# Patient Record
Sex: MALE | Race: WHITE | Employment: STUDENT | URBAN - METROPOLITAN AREA
[De-identification: names, ages, dates, MRNs, and addresses within clinical notes are randomized per-mention and may not be internally consistent; named-entity substitution may affect disease eponyms.]

---
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
## 2021-10-05 ENCOUNTER — OFFICE VISIT (OUTPATIENT)
Dept: FAMILY MEDICINE CLINIC | Facility: CLINIC | Age: 21
End: 2021-10-05
Payer: COMMERCIAL

## 2021-10-05 VITALS
SYSTOLIC BLOOD PRESSURE: 102 MMHG | HEIGHT: 75.5 IN | DIASTOLIC BLOOD PRESSURE: 60 MMHG | RESPIRATION RATE: 16 BRPM | BODY MASS INDEX: 25.35 KG/M2 | TEMPERATURE: 98 F | WEIGHT: 206 LBS | OXYGEN SATURATION: 97 % | HEART RATE: 103 BPM

## 2021-10-05 DIAGNOSIS — J03.90 TONSILLITIS: Primary | ICD-10-CM

## 2021-10-05 PROCEDURE — 3008F BODY MASS INDEX DOCD: CPT | Performed by: PHYSICIAN ASSISTANT

## 2021-10-05 PROCEDURE — 3078F DIAST BP <80 MM HG: CPT | Performed by: PHYSICIAN ASSISTANT

## 2021-10-05 PROCEDURE — 87880 STREP A ASSAY W/OPTIC: CPT | Performed by: PHYSICIAN ASSISTANT

## 2021-10-05 PROCEDURE — 3074F SYST BP LT 130 MM HG: CPT | Performed by: PHYSICIAN ASSISTANT

## 2021-10-05 PROCEDURE — 99213 OFFICE O/P EST LOW 20 MIN: CPT | Performed by: PHYSICIAN ASSISTANT

## 2021-10-05 PROCEDURE — 87081 CULTURE SCREEN ONLY: CPT | Performed by: PHYSICIAN ASSISTANT

## 2021-10-05 RX ORDER — AMOXICILLIN AND CLAVULANATE POTASSIUM 875; 125 MG/1; MG/1
1 TABLET, FILM COATED ORAL 2 TIMES DAILY
Qty: 20 TABLET | Refills: 0 | Status: SHIPPED | OUTPATIENT
Start: 2021-10-05 | End: 2021-10-15

## 2021-10-05 NOTE — PROGRESS NOTES
CHIEF COMPLAINT:   Patient presents with:  Fever: I've been tested for covid and it's negative. Worried may have strep throat - Entered by patient        HPI:   Jun Humphries is 22year old male presents to clinic with complaint of  sore throat.   Sympto clear, sclera white  EARS: TM's clear, non-injected, no bulging, retraction, or fluid bilaterally  NOSE: nares patent, mild nasal discharge and mucosal congestion. THROAT: Posterior pharynx erythematous and injected.  no PND, scant right tonsillar exudates

## 2021-10-05 NOTE — PATIENT INSTRUCTIONS
Tonsillitis in Adults  Tonsillitis is swelling and redness (inflammation) of the tonsils. It happens when the tonsils are infected by a virus or a bacteria. Your tonsils are 2 pink, oval lymph glands at the back of your throat.  They are part of your immu lymph nodes in the neck  · Bacteria and debris collecting on the tonsils (called tonsil stones)  Peritonsillar abscess  This is a severe form of tonsillitis. It occurs when a pocket of pus (an abscess) forms around the tonsil.  You need treatment right away fever and pain, as directed. · Use a cool-mist humidifier to keep the air moist.  In severe cases, a person may be dehydrated or have a blocked airway. They may need to be hospitalized.   Surgery to remove the tonsils (tonsillectomy) may be needed if you h healthcare professional's instructions.

## 2023-07-26 PROBLEM — F98.8 ATTENTION DEFICIT DISORDER (ADD) WITHOUT HYPERACTIVITY: Status: ACTIVE | Noted: 2023-07-26

## 2023-07-26 PROBLEM — L63.0 ALOPECIA (CAPITIS) TOTALIS: Status: ACTIVE | Noted: 2023-07-26

## 2023-08-21 DIAGNOSIS — F98.8 ATTENTION DEFICIT DISORDER (ADD) WITHOUT HYPERACTIVITY: ICD-10-CM

## 2023-08-21 RX ORDER — DEXTROAMPHETAMINE SACCHARATE, AMPHETAMINE ASPARTATE MONOHYDRATE, DEXTROAMPHETAMINE SULFATE AND AMPHETAMINE SULFATE 2.5; 2.5; 2.5; 2.5 MG/1; MG/1; MG/1; MG/1
10 CAPSULE, EXTENDED RELEASE ORAL EVERY MORNING
Qty: 30 CAPSULE | Refills: 0 | Status: SHIPPED | OUTPATIENT
Start: 2023-08-21 | End: 2023-09-20

## 2023-08-23 DIAGNOSIS — F98.8 ATTENTION DEFICIT DISORDER (ADD) WITHOUT HYPERACTIVITY: ICD-10-CM

## 2023-08-23 RX ORDER — DEXTROAMPHETAMINE SACCHARATE, AMPHETAMINE ASPARTATE MONOHYDRATE, DEXTROAMPHETAMINE SULFATE AND AMPHETAMINE SULFATE 2.5; 2.5; 2.5; 2.5 MG/1; MG/1; MG/1; MG/1
10 CAPSULE, EXTENDED RELEASE ORAL EVERY MORNING
Qty: 30 CAPSULE | Refills: 0 | Status: SHIPPED | OUTPATIENT
Start: 2023-08-23 | End: 2023-08-28

## 2023-08-23 NOTE — TELEPHONE ENCOUNTER
Patient calling, patient looking to get early fill on Adderall. Patient states he will be driving to school out of state, will like to get medication before he leaves. Pharmacy states new script needed.

## 2023-08-27 ENCOUNTER — PATIENT MESSAGE (OUTPATIENT)
Dept: FAMILY MEDICINE CLINIC | Facility: CLINIC | Age: 23
End: 2023-08-27

## 2023-08-27 DIAGNOSIS — F98.8 ATTENTION DEFICIT DISORDER (ADD) WITHOUT HYPERACTIVITY: ICD-10-CM

## 2023-08-28 RX ORDER — DEXTROAMPHETAMINE SACCHARATE, AMPHETAMINE ASPARTATE MONOHYDRATE, DEXTROAMPHETAMINE SULFATE AND AMPHETAMINE SULFATE 2.5; 2.5; 2.5; 2.5 MG/1; MG/1; MG/1; MG/1
10 CAPSULE, EXTENDED RELEASE ORAL EVERY MORNING
Qty: 30 CAPSULE | Refills: 0 | Status: SHIPPED | OUTPATIENT
Start: 2023-08-28 | End: 2023-09-27

## 2023-08-28 NOTE — TELEPHONE ENCOUNTER
From: Sydni Guerra  To: Nina Garduno MD  Sent: 8/27/2023 4:24 PM CDT  Subject: Pharmacy    Hello! I just moved down to [place] for school and I am all moved in. Before i left, I tried to  my medication from the [facility] but they said that I was picking it up too early and that i would need some type of special clearance or vacation insurance to pick it up early. The pharmacist said that the soonest I could pick it up is September 4th but I have been out of it for 2 days now. I wanted to change my home pharmacy to the [facility] by my apartment. The address is: [address] [place] [address], [address]  [facility]    I also wanted to know if I could pick it up early because my first day of class is this Tuesday. Please let me know if you need anything on my end. Thank You!

## 2023-09-28 DIAGNOSIS — F98.8 ATTENTION DEFICIT DISORDER (ADD) WITHOUT HYPERACTIVITY: ICD-10-CM

## 2023-09-28 RX ORDER — DEXTROAMPHETAMINE SACCHARATE, AMPHETAMINE ASPARTATE MONOHYDRATE, DEXTROAMPHETAMINE SULFATE AND AMPHETAMINE SULFATE 2.5; 2.5; 2.5; 2.5 MG/1; MG/1; MG/1; MG/1
10 CAPSULE, EXTENDED RELEASE ORAL EVERY MORNING
Qty: 30 CAPSULE | Refills: 0 | Status: CANCELLED | OUTPATIENT
Start: 2023-09-28 | End: 2023-10-28

## 2023-10-26 DIAGNOSIS — F98.8 ATTENTION DEFICIT DISORDER (ADD) WITHOUT HYPERACTIVITY: ICD-10-CM

## 2023-10-27 NOTE — TELEPHONE ENCOUNTER
Requesting Adderall XR 10mg  LOV: 8/21/23   RTC: 3 months  Last Relevant Labs:   Filled: 9/28/23 #30 with 0 refills    No future appointments.     Per [place] , last dispensed 9/28/23 #30    Rx pended and routed for approval/denial

## 2023-10-29 RX ORDER — DEXTROAMPHETAMINE SACCHARATE, AMPHETAMINE ASPARTATE MONOHYDRATE, DEXTROAMPHETAMINE SULFATE AND AMPHETAMINE SULFATE 2.5; 2.5; 2.5; 2.5 MG/1; MG/1; MG/1; MG/1
10 CAPSULE, EXTENDED RELEASE ORAL EVERY MORNING
Qty: 30 CAPSULE | Refills: 0 | Status: SHIPPED | OUTPATIENT
Start: 2023-10-29 | End: 2023-11-28

## 2023-11-26 DIAGNOSIS — F98.8 ATTENTION DEFICIT DISORDER (ADD) WITHOUT HYPERACTIVITY: ICD-10-CM

## 2023-11-26 RX ORDER — DEXTROAMPHETAMINE SACCHARATE, AMPHETAMINE ASPARTATE MONOHYDRATE, DEXTROAMPHETAMINE SULFATE AND AMPHETAMINE SULFATE 2.5; 2.5; 2.5; 2.5 MG/1; MG/1; MG/1; MG/1
10 CAPSULE, EXTENDED RELEASE ORAL EVERY MORNING
Qty: 30 CAPSULE | Refills: 0 | Status: SHIPPED | OUTPATIENT
Start: 2023-11-26 | End: 2023-12-26

## 2023-12-23 DIAGNOSIS — F98.8 ATTENTION DEFICIT DISORDER (ADD) WITHOUT HYPERACTIVITY: ICD-10-CM

## 2023-12-26 RX ORDER — DEXTROAMPHETAMINE SACCHARATE, AMPHETAMINE ASPARTATE MONOHYDRATE, DEXTROAMPHETAMINE SULFATE AND AMPHETAMINE SULFATE 2.5; 2.5; 2.5; 2.5 MG/1; MG/1; MG/1; MG/1
10 CAPSULE, EXTENDED RELEASE ORAL EVERY MORNING
Qty: 30 CAPSULE | Refills: 0 | Status: SHIPPED | OUTPATIENT
Start: 2023-12-26 | End: 2024-01-25

## 2024-01-22 DIAGNOSIS — F98.8 ATTENTION DEFICIT DISORDER (ADD) WITHOUT HYPERACTIVITY: ICD-10-CM

## 2024-01-22 RX ORDER — DEXTROAMPHETAMINE SACCHARATE, AMPHETAMINE ASPARTATE MONOHYDRATE, DEXTROAMPHETAMINE SULFATE AND AMPHETAMINE SULFATE 2.5; 2.5; 2.5; 2.5 MG/1; MG/1; MG/1; MG/1
10 CAPSULE, EXTENDED RELEASE ORAL EVERY MORNING
Qty: 30 CAPSULE | Refills: 0 | Status: SHIPPED | OUTPATIENT
Start: 2024-01-22 | End: 2024-02-21

## 2024-01-25 NOTE — PROGRESS NOTES
Dx:  Sprain of costal cartilage (S23.41XA)  Sprain of ribs (S23.41XA)       Authorized # of Visits: NA       Next MD visit: none scheduled  Fall Risk: standard         Precautions: n/a           Subjective: Patient states that he did not feel pain with act g  lunges with OH lift 10# each arm x 1 L       Prone central PA glides Grade III T1 to T6 30 sec x 3 x x       Seated thoracic thrust X 1 rep TRX rotation (B) x 7 reps        TRX flexion for UE & spine stretch  x       Skilled Services: Manual therapy, ve Render Post-Care Instructions In Note?: no Post-Care Instructions: I reviewed with the patient in detail post-care instructions. Patient is to wear sunprotection, and avoid picking at any of the treated lesions. Pt may apply Vaseline to crusted or scabbing areas. Consent: The patient's consent was obtained including but not limited to risks of crusting, scabbing, blistering, scarring, darker or lighter pigmentary change, recurrence, incomplete removal and infection. Show Aperture Variable?: Yes Duration Of Freeze Thaw-Cycle (Seconds): 0 Detail Level: Detailed

## 2024-03-05 ENCOUNTER — PATIENT MESSAGE (OUTPATIENT)
Dept: FAMILY MEDICINE CLINIC | Facility: CLINIC | Age: 24
End: 2024-03-05

## 2024-03-05 NOTE — TELEPHONE ENCOUNTER
From: João Posey  To: Oj Martinez  Sent: 3/5/2024 1:42 PM CST  Subject: Medication Refill Error    Hey Dr. Martinez,  For some reason, sometimes whenever I go to refill my medication through this [facility] steve, it deletes the medication out of mine so Im unable to request a refill. Just wanted to know if I could request and refill or if you could help.     Thanks and Have a Good One!  -João

## 2024-03-06 DIAGNOSIS — F98.8 ATTENTION DEFICIT DISORDER (ADD) WITHOUT HYPERACTIVITY: ICD-10-CM

## 2024-03-06 RX ORDER — DEXTROAMPHETAMINE SACCHARATE, AMPHETAMINE ASPARTATE MONOHYDRATE, DEXTROAMPHETAMINE SULFATE AND AMPHETAMINE SULFATE 2.5; 2.5; 2.5; 2.5 MG/1; MG/1; MG/1; MG/1
10 CAPSULE, EXTENDED RELEASE ORAL EVERY MORNING
Qty: 30 CAPSULE | Refills: 0 | Status: SHIPPED | OUTPATIENT
Start: 2024-03-06 | End: 2024-04-05

## 2024-03-06 NOTE — TELEPHONE ENCOUNTER
Medication Quantity Refills Start End   amphetamine-dextroamphetamine ER (ADDERALL XR) 10 MG Oral Capsule SR 24 Hr () 30 capsule 0 2024   Sig:   Take 1 capsule (10 mg total) by mouth every morning.     Route:   Oral     Earliest Fill Date:   2024     Order #:   [id number]       Video appt 23  No future appointments.     Amphetamine-Dextroamphetamine     Dispensed Written Strength Quantity Refills Days Supply Provider Pharmacy   D-AMPHETAMINE ER 10MG SALT COMBO CP 2024  30 each  30 Oj Martinez MD [facility] #...   AMPHETAMINE-DEXTROAMPHET ER 10 MG C AMPHETAMINE-DEXTROAMPHET ER 10 MG C P24 2023  30 applicator  30 Oj Martinez MD [id number] -          Please advise

## 2024-03-06 NOTE — TELEPHONE ENCOUNTER
Patient calling on Adderall refill, patient currently out of state for school. He will need short term refill for now. Virtual visit is not an option, he has to be in [place] for any virtual visit. Patient to be home in May, he will schedule upon return. Please advise

## 2024-04-17 ENCOUNTER — PATIENT MESSAGE (OUTPATIENT)
Dept: FAMILY MEDICINE CLINIC | Facility: CLINIC | Age: 24
End: 2024-04-17

## 2024-04-17 DIAGNOSIS — F98.8 ATTENTION DEFICIT DISORDER (ADD) WITHOUT HYPERACTIVITY: ICD-10-CM

## 2024-04-17 RX ORDER — DEXTROAMPHETAMINE SACCHARATE, AMPHETAMINE ASPARTATE MONOHYDRATE, DEXTROAMPHETAMINE SULFATE AND AMPHETAMINE SULFATE 2.5; 2.5; 2.5; 2.5 MG/1; MG/1; MG/1; MG/1
10 CAPSULE, EXTENDED RELEASE ORAL EVERY MORNING
Qty: 30 CAPSULE | Refills: 0 | Status: SHIPPED | OUTPATIENT
Start: 2024-04-17 | End: 2024-05-17

## 2024-04-17 NOTE — TELEPHONE ENCOUNTER
From: João Posey  To: Oj Martinez  Sent: 4/17/2024 1:19 PM CDT  Subject: Prescription Refill    Hello,  Last month I contacted you guys about getting a refill and they said that I would need to do an in person visit just for some follow up questions and such before I could get a refill. I am still in college in [place] now but I was still able to get my refill last month. This is my last month of school before I graduate May 3rd and come home the second week of May.  I was wondering if I could schedule an appointment for a follow up visit for when I am home in May as well as get a refill for my prescription until then.    Thank You!  -João

## 2024-06-21 ENCOUNTER — PATIENT MESSAGE (OUTPATIENT)
Dept: FAMILY MEDICINE CLINIC | Facility: CLINIC | Age: 24
End: 2024-06-21

## 2024-06-21 DIAGNOSIS — F98.8 ATTENTION DEFICIT DISORDER (ADD) WITHOUT HYPERACTIVITY: ICD-10-CM

## 2024-06-21 RX ORDER — DEXTROAMPHETAMINE SACCHARATE, AMPHETAMINE ASPARTATE MONOHYDRATE, DEXTROAMPHETAMINE SULFATE AND AMPHETAMINE SULFATE 2.5; 2.5; 2.5; 2.5 MG/1; MG/1; MG/1; MG/1
10 CAPSULE, EXTENDED RELEASE ORAL EVERY MORNING
Qty: 30 CAPSULE | Refills: 0 | Status: SHIPPED | OUTPATIENT
Start: 2024-06-21 | End: 2024-07-21

## 2024-06-21 NOTE — TELEPHONE ENCOUNTER
Medication Quantity Refills Start End   amphetamine-dextroamphetamine ER (ADDERALL XR) 10 MG Oral Capsule SR 24 Hr 30 capsule 0 5/22/2024 6/21/2024   Sig:   Take 1 capsule (10 mg total) by mouth every morning.     Route:   Oral     Earliest Fill Date:   5/22/2024     Order #:   [id number]       Last OV 5/22/24    Amphetamine-Dextroamphetamine     Dispensed Written Strength Quantity Refills Days Supply Provider Pharmacy   AMPHETAMINE ER 10MG CAP ACTA 05/22/2024 05/22/2024  30 each  30 Oj Martinez MD [id number] - [place]...   D-AMPHETAMINE ER 10MG SALT COMBO CP 04/18/2024 04/17/2024  30 each  30 Oj Martinez MD [facility] #...

## 2024-06-21 NOTE — TELEPHONE ENCOUNTER
From: João Posey  To: Oj Martinez  Sent: 6/21/2024 7:36 AM CDT  Subject: Pharmacy     Hi,    I just renewed my prescription. Wanted to make sure it was sent to the [facility] in [place]. It only shows the [facility] in [place]. I wanted to be sure bc I am driving up to [place] today

## 2024-06-22 DIAGNOSIS — F98.8 ATTENTION DEFICIT DISORDER (ADD) WITHOUT HYPERACTIVITY: ICD-10-CM

## 2024-06-25 RX ORDER — DEXTROAMPHETAMINE SACCHARATE, AMPHETAMINE ASPARTATE MONOHYDRATE, DEXTROAMPHETAMINE SULFATE AND AMPHETAMINE SULFATE 2.5; 2.5; 2.5; 2.5 MG/1; MG/1; MG/1; MG/1
10 CAPSULE, EXTENDED RELEASE ORAL EVERY MORNING
Qty: 30 CAPSULE | Refills: 0 | Status: SHIPPED | OUTPATIENT
Start: 2024-06-25 | End: 2024-07-25

## (undated) NOTE — LETTER
Patient Name: Mariposa Ace  YOB: 2000          MRN number:  [id number]  Date:  8/24/2017  Referring Physician:  Edilberto Virgen    Discharge Summary    Dear Dr. Hamzah Webster,     Discharge Summary    Pt has attended 8, cancelled 0, and no shown Electronically signed by therapist: Jose Miguel Martinez, PT    [de-identified] certification required: Yes  I certify the need for these services furnished under this plan of treatment and while under my care.     X___________________________________________________ Date

## (undated) NOTE — LETTER
Date: 10/5/2021    Patient Name: Peter Easley          To Whom it may concern: This letter has been written at the patient's request. The above patient was seen at the [facility] for treatment of tonsillitis.   Rapid strep was negative toda